# Patient Record
Sex: MALE | URBAN - NONMETROPOLITAN AREA
[De-identification: names, ages, dates, MRNs, and addresses within clinical notes are randomized per-mention and may not be internally consistent; named-entity substitution may affect disease eponyms.]

---

## 2020-06-02 ENCOUNTER — OFFICE VISIT (OUTPATIENT)
Dept: URBAN - NONMETROPOLITAN AREA MEDICAL CENTER 1 | Facility: MEDICAL CENTER | Age: 75
End: 2020-06-02
Payer: COMMERCIAL

## 2020-06-02 DIAGNOSIS — K22.2 ACQUIRED ESOPHAGEAL RING: ICD-10-CM

## 2020-06-02 DIAGNOSIS — K22.8 COLUMNAR-LINED ESOPHAGUS: ICD-10-CM

## 2020-06-02 DIAGNOSIS — K29.60 ADENOPAPILLOMATOSIS GASTRICA: ICD-10-CM

## 2020-06-02 PROCEDURE — 43239 EGD BIOPSY SINGLE/MULTIPLE: CPT | Performed by: INTERNAL MEDICINE

## 2020-06-02 PROCEDURE — 43249 ESOPH EGD DILATION <30 MM: CPT | Performed by: INTERNAL MEDICINE

## 2020-07-13 ENCOUNTER — OFFICE VISIT (OUTPATIENT)
Dept: URBAN - METROPOLITAN AREA TELEHEALTH 2 | Facility: TELEHEALTH | Age: 75
End: 2020-07-13
Payer: COMMERCIAL

## 2020-07-13 DIAGNOSIS — R13.19 CERVICAL DYSPHAGIA: ICD-10-CM

## 2020-07-13 DIAGNOSIS — D50.9 IDA (IRON DEFICIENCY ANEMIA): ICD-10-CM

## 2020-07-13 DIAGNOSIS — K22.2 PEPTIC STRICTURE OF ESOPHAGUS: ICD-10-CM

## 2020-07-13 DIAGNOSIS — Q27.30 AVM (ARTERIOVENOUS MALFORMATION): ICD-10-CM

## 2020-07-13 DIAGNOSIS — D50.8 IRON DEFICIENCY ANEMIA DUE TO DIETARY CAUSES: ICD-10-CM

## 2020-07-13 DIAGNOSIS — R13.10 DYSPHAGIA: ICD-10-CM

## 2020-07-13 DIAGNOSIS — I63.9 CVA (CEREBROVASCULAR ACCIDENT): ICD-10-CM

## 2020-07-13 PROCEDURE — 4004F PT TOBACCO SCREEN RCVD TLK: CPT | Performed by: INTERNAL MEDICINE

## 2020-07-13 PROCEDURE — G9902 PT SCRN TBCO AND ID AS USER: HCPCS | Performed by: INTERNAL MEDICINE

## 2020-07-13 PROCEDURE — G9906 PT RECV TBCO CESS INTERV: HCPCS | Performed by: INTERNAL MEDICINE

## 2020-07-13 PROCEDURE — 99443 PHONE E/M BY PHYS 21-30 MIN: CPT | Performed by: INTERNAL MEDICINE

## 2020-07-13 RX ORDER — WARFARIN SODIUM 5 MG/1
TABLET ORAL
Qty: 0 | Refills: 0 | Status: ACTIVE | COMMUNITY
Start: 1900-01-01

## 2020-07-13 RX ORDER — DRONEDARONE 400 MG/1
TAKE 1 TABLET (400 MG) BY ORAL ROUTE 2 TIMES PER DAY WITH MORNING AND EVENING MEALS TABLET, FILM COATED ORAL 2
Qty: 0 | Refills: 0 | Status: ACTIVE | COMMUNITY
Start: 1900-01-01

## 2020-07-13 NOTE — HPI-TODAY'S VISIT:
Pt reports that his swallowing has improved.  Pt reports that he remains on pantoprazole.  Pt has some occasional trouble with dysphagia.  Pathology was reviewed with patient

## 2020-09-29 ENCOUNTER — OFFICE VISIT (OUTPATIENT)
Dept: URBAN - METROPOLITAN AREA CLINIC 54 | Facility: CLINIC | Age: 75
End: 2020-09-29
Payer: COMMERCIAL

## 2020-09-29 ENCOUNTER — TELEPHONE ENCOUNTER (OUTPATIENT)
Dept: URBAN - METROPOLITAN AREA CLINIC 54 | Facility: CLINIC | Age: 75
End: 2020-09-29

## 2020-09-29 DIAGNOSIS — K92.1 BLOOD IN STOOL: ICD-10-CM

## 2020-09-29 DIAGNOSIS — R10.32 ABDOMINAL CRAMPING IN LEFT LOWER QUADRANT: ICD-10-CM

## 2020-09-29 DIAGNOSIS — K57.92 DIVERTICULITIS: ICD-10-CM

## 2020-09-29 DIAGNOSIS — K22.2 PEPTIC STRICTURE OF ESOPHAGUS: ICD-10-CM

## 2020-09-29 PROBLEM — 307496006 DIVERTICULITIS: Status: ACTIVE | Noted: 2020-09-29

## 2020-09-29 PROCEDURE — G8417 CALC BMI ABV UP PARAM F/U: HCPCS | Performed by: INTERNAL MEDICINE

## 2020-09-29 PROCEDURE — 1036F TOBACCO NON-USER: CPT | Performed by: INTERNAL MEDICINE

## 2020-09-29 PROCEDURE — 99215 OFFICE O/P EST HI 40 MIN: CPT | Performed by: INTERNAL MEDICINE

## 2020-09-29 PROCEDURE — 3017F COLORECTAL CA SCREEN DOC REV: CPT | Performed by: INTERNAL MEDICINE

## 2020-09-29 PROCEDURE — G8427 DOCREV CUR MEDS BY ELIG CLIN: HCPCS | Performed by: INTERNAL MEDICINE

## 2020-09-29 PROCEDURE — G9903 PT SCRN TBCO ID AS NON USER: HCPCS | Performed by: INTERNAL MEDICINE

## 2020-09-29 RX ORDER — DRONEDARONE 400 MG/1
TAKE 1 TABLET (400 MG) BY ORAL ROUTE 2 TIMES PER DAY WITH MORNING AND EVENING MEALS TABLET, FILM COATED ORAL 2
Qty: 0 | Refills: 0 | Status: ACTIVE | COMMUNITY
Start: 1900-01-01

## 2020-09-29 RX ORDER — WARFARIN SODIUM 5 MG/1
TABLET ORAL
Qty: 0 | Refills: 0 | Status: ACTIVE | COMMUNITY
Start: 1900-01-01

## 2020-09-29 NOTE — PHYSICAL EXAM CONSTITUTIONAL:
well developed, well nourished , in no acute distress , wheelchair bound , normal communication ability

## 2020-09-29 NOTE — PHYSICAL EXAM CHEST:
no lesions,  no deformities,  no traumatic injuries,  no significant scars are present,  chest wall non-tender,  no masses present, breathing is unlabored without accessory muscle use wheezing b/l

## 2020-09-29 NOTE — HPI-TODAY'S VISIT:
Hx of CVA, peptic stricture s/p dilation in June 2020, DM, wheelchair bound, long term use of warfarin and hx of polyps returns for f/u visit. Pt today with c/o llq pain that radiates to the back.  Pt report that he has had multiple ER visits as a results.  Pt reports that the pain is intermittent.  Pt reports that the CT scan was diverticulitis.   Pt reports that he was advised to f/u with urology and GI at the time of discharge.  Pt reports that he continued to have the abdominal pain.  Pt reports no diarrhea. Pt reports that he has had some intermittent rectal bleeding.

## 2020-10-18 ENCOUNTER — TELEPHONE ENCOUNTER (OUTPATIENT)
Dept: URBAN - METROPOLITAN AREA CLINIC 92 | Facility: CLINIC | Age: 75
End: 2020-10-18

## 2020-10-21 ENCOUNTER — TELEPHONE ENCOUNTER (OUTPATIENT)
Dept: URBAN - METROPOLITAN AREA CLINIC 54 | Facility: CLINIC | Age: 75
End: 2020-10-21

## 2020-10-27 ENCOUNTER — OFFICE VISIT (OUTPATIENT)
Dept: URBAN - METROPOLITAN AREA MEDICAL CENTER 23 | Facility: MEDICAL CENTER | Age: 75
End: 2020-10-27
Payer: COMMERCIAL

## 2020-10-27 ENCOUNTER — OFFICE VISIT (OUTPATIENT)
Dept: URBAN - METROPOLITAN AREA CLINIC 54 | Facility: CLINIC | Age: 75
End: 2020-10-27

## 2020-10-27 DIAGNOSIS — K22.10 BARRETT'S ESOPHAGEAL ULCERATION: ICD-10-CM

## 2020-10-27 DIAGNOSIS — K22.2 ACQUIRED ESOPHAGEAL RING: ICD-10-CM

## 2020-10-27 DIAGNOSIS — K44.9 DIAPHRAGMATIC HERNIA: ICD-10-CM

## 2020-10-27 PROCEDURE — 43249 ESOPH EGD DILATION <30 MM: CPT | Performed by: INTERNAL MEDICINE

## 2020-10-27 PROCEDURE — 43239 EGD BIOPSY SINGLE/MULTIPLE: CPT | Performed by: INTERNAL MEDICINE

## 2020-11-11 ENCOUNTER — OUT OF OFFICE VISIT (OUTPATIENT)
Dept: URBAN - NONMETROPOLITAN AREA MEDICAL CENTER 1 | Facility: MEDICAL CENTER | Age: 75
End: 2020-11-11
Payer: COMMERCIAL

## 2020-11-11 DIAGNOSIS — R13.19 CERVICAL DYSPHAGIA: ICD-10-CM

## 2020-11-11 DIAGNOSIS — Z79.01 ANTICOAGULANT LONG-TERM USE: ICD-10-CM

## 2020-11-11 DIAGNOSIS — R19.7 ACUTE DIARRHEA: ICD-10-CM

## 2020-11-11 PROCEDURE — G8427 DOCREV CUR MEDS BY ELIG CLIN: HCPCS | Performed by: INTERNAL MEDICINE

## 2020-11-11 PROCEDURE — 99222 1ST HOSP IP/OBS MODERATE 55: CPT | Performed by: INTERNAL MEDICINE

## 2020-11-14 ENCOUNTER — OUT OF OFFICE VISIT (OUTPATIENT)
Dept: URBAN - NONMETROPOLITAN AREA MEDICAL CENTER 1 | Facility: MEDICAL CENTER | Age: 75
End: 2020-11-14
Payer: COMMERCIAL

## 2020-11-14 DIAGNOSIS — K22.2 ACQUIRED ESOPHAGEAL RING: ICD-10-CM

## 2020-11-14 PROCEDURE — 43249 ESOPH EGD DILATION <30 MM: CPT | Performed by: INTERNAL MEDICINE

## 2020-11-16 ENCOUNTER — OFFICE VISIT (OUTPATIENT)
Dept: URBAN - METROPOLITAN AREA CLINIC 54 | Facility: CLINIC | Age: 75
End: 2020-11-16

## 2020-11-20 ENCOUNTER — OFFICE VISIT (OUTPATIENT)
Dept: URBAN - NONMETROPOLITAN AREA CLINIC 4 | Facility: CLINIC | Age: 75
End: 2020-11-20
Payer: COMMERCIAL

## 2020-11-20 DIAGNOSIS — K22.2 PEPTIC STRICTURE OF ESOPHAGUS: ICD-10-CM

## 2020-11-20 DIAGNOSIS — E11.9 DIABETES: ICD-10-CM

## 2020-11-20 DIAGNOSIS — K20.90 ESOPHAGITIS: ICD-10-CM

## 2020-11-20 DIAGNOSIS — I48.91 AFIB: ICD-10-CM

## 2020-11-20 DIAGNOSIS — D50.9 IDA (IRON DEFICIENCY ANEMIA): ICD-10-CM

## 2020-11-20 DIAGNOSIS — R13.10 DYSPHAGIA: ICD-10-CM

## 2020-11-20 DIAGNOSIS — I63.9 CVA (CEREBROVASCULAR ACCIDENT): ICD-10-CM

## 2020-11-20 PROBLEM — 111552007 DIABETES MELLITUS WITHOUT COMPLICATION: Status: ACTIVE | Noted: 2020-11-20

## 2020-11-20 PROBLEM — 49436004 ATRIAL FIBRILLATION: Status: ACTIVE | Noted: 2020-11-20

## 2020-11-20 PROCEDURE — 1036F TOBACCO NON-USER: CPT | Performed by: REGISTERED NURSE

## 2020-11-20 PROCEDURE — G8427 DOCREV CUR MEDS BY ELIG CLIN: HCPCS | Performed by: REGISTERED NURSE

## 2020-11-20 PROCEDURE — G8938 BMI DOC ONL FUP NT DOC: HCPCS | Performed by: REGISTERED NURSE

## 2020-11-20 PROCEDURE — G8482 FLU IMMUNIZE ORDER/ADMIN: HCPCS | Performed by: REGISTERED NURSE

## 2020-11-20 PROCEDURE — 3017F COLORECTAL CA SCREEN DOC REV: CPT | Performed by: REGISTERED NURSE

## 2020-11-20 PROCEDURE — 99213 OFFICE O/P EST LOW 20 MIN: CPT | Performed by: REGISTERED NURSE

## 2020-11-20 RX ORDER — DRONEDARONE 400 MG/1
TAKE 1 TABLET (400 MG) BY ORAL ROUTE 2 TIMES PER DAY WITH MORNING AND EVENING MEALS TABLET, FILM COATED ORAL 2
Qty: 0 | Refills: 0 | Status: ACTIVE | COMMUNITY
Start: 1900-01-01

## 2020-11-20 RX ORDER — WARFARIN SODIUM 5 MG/1
TABLET ORAL
Qty: 0 | Refills: 0 | Status: ACTIVE | COMMUNITY
Start: 1900-01-01

## 2020-11-20 NOTE — HPI-OTHER HISTORIES
11/20/20: Patient presents for hospital f/u. He was admitted to [facility] 11/10-11/15.   Hospital course: Patient has had an uneventful hospital course.  Dehydration, hypokalemia and YIFAN corrected with IV fluids and electrolyte replacement protocol during his stay in the hospital.  Hospital stay was slightly prolonged due to need for reversal of Coumadin for upper GI endoscopy and stretching of his recalcitrant esophageal stricture.  Patient required 2 units of FFP and 2 doses of 5 mg vitamin K for Coumadin reversal.  EGD done by Dr. Adhikari on 11/14 revealed peptic stricture at 40 cm from the incisors 35 cm with associated esophagitis, this was not biopsied due to recent intervention with biopsy.  This was followed by through-the-scope balloon dilation with a CRE balloon held at 1 minute interval and  15 - 18 mmmm, EBL minimal, mucosa was friable.Hiatal hernia 2.0 cm. Otherwise normal exam except for mild erythema and superficial ulceration of the duodenum.  Patient also received empiric IV antibiotics Flagyl and Rocephin for colitis x 5 days.  Stool studies including stool for C. difficile were essentially unremarkable.  Patient's diarrhea has resolved.  Patient is tolerating soft diet and is able to swallow his pills now.   Patient is currently hemodynamically stable for discharge home today.  Discharge plan discussed with patient and spouse at bedside and they agreed with plan.  Patient has an outpatient appointment with GI [Dr. Cervantes] tomorrow- 11/16.  Patient advised to take 5 mg of Coumadin tonight and tomorrow night and to revert back to his usual dose of 2.5 mg daily from Tuesday 11/17.  Patient advised follow-up with PCP within 1 week of discharge.  Since discharge, he continues to c/o intermittent dysphagia to solids and liquids (oropharyngeal stage). He also reports coughing up clear phlegm. Denies any abd pain, nausea, vomiting, odynophagia.   He was seen by the speech therapist yesterday, but pt declined evaluation.

## 2021-03-09 ENCOUNTER — DASHBOARD ENCOUNTERS (OUTPATIENT)
Age: 76
End: 2021-03-09

## 2021-03-09 ENCOUNTER — OFFICE VISIT (OUTPATIENT)
Dept: URBAN - NONMETROPOLITAN AREA CLINIC 4 | Facility: CLINIC | Age: 76
End: 2021-03-09
Payer: COMMERCIAL

## 2021-03-09 VITALS
SYSTOLIC BLOOD PRESSURE: 176 MMHG | WEIGHT: 180 LBS | HEART RATE: 66 BPM | TEMPERATURE: 95.6 F | BODY MASS INDEX: 24.38 KG/M2 | DIASTOLIC BLOOD PRESSURE: 81 MMHG | HEIGHT: 72 IN

## 2021-03-09 DIAGNOSIS — I63.89 CEREBROVASCULAR ACCIDENT (CVA) DUE TO OTHER MECHANISM: ICD-10-CM

## 2021-03-09 DIAGNOSIS — K94.23 PEG TUBE MALFUNCTION: ICD-10-CM

## 2021-03-09 DIAGNOSIS — I63.9 CVA (CEREBROVASCULAR ACCIDENT): ICD-10-CM

## 2021-03-09 DIAGNOSIS — K22.2 ESOPHAGEAL STRICTURE: ICD-10-CM

## 2021-03-09 PROBLEM — 63305008: Status: ACTIVE | Noted: 2021-03-09

## 2021-03-09 PROCEDURE — 99214 OFFICE O/P EST MOD 30 MIN: CPT | Performed by: INTERNAL MEDICINE

## 2021-03-09 RX ORDER — WARFARIN SODIUM 5 MG/1
TABLET ORAL
Qty: 0 | Refills: 0 | Status: ACTIVE | COMMUNITY

## 2021-03-09 RX ORDER — DRONEDARONE 400 MG/1
TAKE 1 TABLET (400 MG) BY ORAL ROUTE 2 TIMES PER DAY WITH MORNING AND EVENING MEALS TABLET, FILM COATED ORAL 2
Qty: 0 | Refills: 0 | Status: ACTIVE | COMMUNITY

## 2021-03-09 NOTE — EXAM-PHYSICAL EXAM
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  Wheelchair-bound.  Abdomen with PEG site intact.  This was flushed successfully with water.  No resistance

## 2021-03-09 NOTE — HPI-TODAY'S VISIT:
dysphagia  peg tube not flushing no bleeding Bedside PEG tube was successfully performed and the PEG tube flushed with ease

## 2021-04-13 ENCOUNTER — OFFICE VISIT (OUTPATIENT)
Dept: URBAN - NONMETROPOLITAN AREA CLINIC 4 | Facility: CLINIC | Age: 76
End: 2021-04-13

## 2021-04-15 ENCOUNTER — TELEPHONE ENCOUNTER (OUTPATIENT)
Dept: URBAN - METROPOLITAN AREA CLINIC 92 | Facility: CLINIC | Age: 76
End: 2021-04-15